# Patient Record
Sex: MALE | URBAN - NONMETROPOLITAN AREA
[De-identification: names, ages, dates, MRNs, and addresses within clinical notes are randomized per-mention and may not be internally consistent; named-entity substitution may affect disease eponyms.]

---

## 2024-02-20 ENCOUNTER — APPOINTMENT (OUTPATIENT)
Dept: URBAN - NONMETROPOLITAN AREA CLINIC 48 | Age: 69
Setting detail: DERMATOLOGY
End: 2024-02-22

## 2024-02-20 DIAGNOSIS — L28.0 LICHEN SIMPLEX CHRONICUS: ICD-10-CM

## 2024-02-20 PROCEDURE — 99214 OFFICE O/P EST MOD 30 MIN: CPT

## 2024-02-20 PROCEDURE — OTHER COUNSELING: OTHER

## 2024-02-20 PROCEDURE — OTHER MIPS QUALITY: OTHER

## 2024-02-20 PROCEDURE — OTHER PRESCRIPTION MEDICATION MANAGEMENT: OTHER

## 2024-02-20 PROCEDURE — OTHER ADDITIONAL NOTES: OTHER

## 2024-02-20 ASSESSMENT — LOCATION DETAILED DESCRIPTION DERM
LOCATION DETAILED: LEFT VENTRAL PROXIMAL FOREARM
LOCATION DETAILED: LEFT ANTERIOR PROXIMAL THIGH
LOCATION DETAILED: INFERIOR THORACIC SPINE
LOCATION DETAILED: PERIUMBILICAL SKIN
LOCATION DETAILED: RIGHT ANTERIOR PROXIMAL THIGH
LOCATION DETAILED: RIGHT ANTECUBITAL SKIN

## 2024-02-20 ASSESSMENT — LOCATION SIMPLE DESCRIPTION DERM
LOCATION SIMPLE: UPPER BACK
LOCATION SIMPLE: ABDOMEN
LOCATION SIMPLE: RIGHT THIGH
LOCATION SIMPLE: LEFT FOREARM
LOCATION SIMPLE: LEFT THIGH
LOCATION SIMPLE: RIGHT ELBOW

## 2024-02-20 ASSESSMENT — LOCATION ZONE DERM
LOCATION ZONE: LEG
LOCATION ZONE: TRUNK
LOCATION ZONE: ARM

## 2024-02-20 NOTE — PROCEDURE: ADDITIONAL NOTES
Additional Notes: Pt states he has no sanam in dermatologists given years of non resolution for this condition.  No biopsy was done until he had a recently unrelated GI surgery and that surgeon did an excisional bx.  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  Notes from surgeon indicated suspicion of pickers lesions.  Pt states he has read that this could be due to nerves but denies anxiety or stress.   No treatments for stress or anxiety have been tried to date.  PCP has just left and he will be seeing a new provider next week.  He has had gabapentin in the past (unsre if it was for this or something else) and he did not tolerate it well.  Discussed that will discuss condition and treatment with new PCP will be seeing
Detail Level: Simple
Render Risk Assessment In Note?: yes

## 2024-02-20 NOTE — PROCEDURE: PRESCRIPTION MEDICATION MANAGEMENT
Render In Strict Bullet Format?: No
Detail Level: Zone
Initiate Treatment: Discussed importance of avoiding picking, keep skin well moisturized

## 2024-06-04 ENCOUNTER — APPOINTMENT (OUTPATIENT)
Dept: URBAN - METROPOLITAN AREA CLINIC 216 | Age: 69
Setting detail: DERMATOLOGY
End: 2024-06-06

## 2024-06-04 DIAGNOSIS — L28.1 PRURIGO NODULARIS: ICD-10-CM

## 2024-06-04 PROCEDURE — 99214 OFFICE O/P EST MOD 30 MIN: CPT

## 2024-06-04 PROCEDURE — OTHER COUNSELING: OTHER

## 2024-06-04 PROCEDURE — OTHER DUPIXENT INJECTION: OTHER

## 2024-06-04 PROCEDURE — OTHER ORDER TESTS: OTHER

## 2024-06-04 ASSESSMENT — LOCATION SIMPLE DESCRIPTION DERM
LOCATION SIMPLE: RIGHT UPPER ARM
LOCATION SIMPLE: LEFT UPPER ARM

## 2024-06-04 ASSESSMENT — LOCATION DETAILED DESCRIPTION DERM
LOCATION DETAILED: RIGHT PROXIMAL POSTERIOR UPPER ARM
LOCATION DETAILED: LEFT PROXIMAL POSTERIOR UPPER ARM

## 2024-06-04 ASSESSMENT — LOCATION ZONE DERM: LOCATION ZONE: ARM

## 2024-06-04 NOTE — PROCEDURE: DUPIXENT INJECTION
Ndc (300 Mg Prefilled Syringe): 5711-1972-38
Hide Non-Enhanced Ndc Variable: No
Detail Level: None
Lot # (Optional): [id number]
Ndc (200 Mg Prefilled Pen): 2826-4942-75
10299 Billing Preferences: 1
Render If Medication Purchased By Clinic In Visit Note?: Yes
Ndc (300 Mg Prefilled Pen): 1821-0686-65
Date Of Next Injection: 2 Weeks
Expiration Date (Optional): 2025/10/31
Syringe Size Used (Required For Enhanced Ndc): 300 mg/2ml prefilled pen
J-Code: 
Dupixent Dosing: 600 mg
Ndc (200 Mg Prefilled Syringe): 2653-5312-36
Administered By (Optional): THEA Bauer
Consent: The risks of pain and injection site reactions were reviewed with the patient prior to the injection.

## 2024-06-04 NOTE — PROCEDURE: ORDER TESTS
Performing Laboratory: [id number]
Bill For Surgical Tray: no
Expected Date Of Service: 06/04/2024
Billing Type: Third-Party Bill
Lab Facility: [id number]

## 2024-06-18 ENCOUNTER — APPOINTMENT (OUTPATIENT)
Dept: URBAN - METROPOLITAN AREA CLINIC 216 | Age: 69
Setting detail: DERMATOLOGY
End: 2024-06-18

## 2024-06-18 DIAGNOSIS — L28.1 PRURIGO NODULARIS: ICD-10-CM

## 2024-06-18 PROCEDURE — OTHER PRESCRIPTION: OTHER

## 2024-06-18 PROCEDURE — OTHER COUNSELING: OTHER

## 2024-06-18 PROCEDURE — OTHER DUPIXENT INJECTION: OTHER

## 2024-06-18 PROCEDURE — 99213 OFFICE O/P EST LOW 20 MIN: CPT

## 2024-06-18 RX ORDER — DUPILUMAB 300 MG/2ML
300MG INJECTION, SOLUTION SUBCUTANEOUS EVERY 2 WEEKS
Qty: 12 | Refills: 3 | Status: ERX | COMMUNITY
Start: 2024-06-18

## 2024-06-18 ASSESSMENT — LOCATION ZONE DERM: LOCATION ZONE: TRUNK

## 2024-06-18 ASSESSMENT — LOCATION DETAILED DESCRIPTION DERM: LOCATION DETAILED: PERIUMBILICAL SKIN

## 2024-06-18 ASSESSMENT — LOCATION SIMPLE DESCRIPTION DERM: LOCATION SIMPLE: ABDOMEN

## 2024-06-18 NOTE — PROCEDURE: DUPIXENT INJECTION
Ndc (300 Mg Prefilled Syringe): 9016-2724-70
Hide Non-Enhanced Ndc Variable: No
Detail Level: None
Lot # (Optional): [id number]
Ndc (200 Mg Prefilled Pen): 4556-1692-76
18782 Billing Preferences: 1
Render If Medication Purchased By Clinic In Visit Note?: Yes
Ndc (300 Mg Prefilled Pen): 4577-4957-74
Date Of Next Injection: 2 Weeks
Additional Comments: Pt given sample in office today, dupixent my way form filled out today, gave patient one injection to take home to self administer in 2 weeks
Expiration Date (Optional): 2026/08
Syringe Size Used (Required For Enhanced Ndc): 300 mg/2ml prefilled syringe
J-Code: 
Dupixent Dosing: 300 mg
Ndc (200 Mg Prefilled Syringe): 3064-0718-54
Administered By (Optional): Kelsey granger
Consent: The risks of pain and injection site reactions were reviewed with the patient prior to the injection.

## 2024-07-10 RX ORDER — DUPILUMAB 300 MG/2ML
300MG INJECTION, SOLUTION SUBCUTANEOUS EVERY 2 WEEKS
Qty: 12 | Refills: 3

## 2024-07-16 ENCOUNTER — APPOINTMENT (OUTPATIENT)
Dept: URBAN - METROPOLITAN AREA CLINIC 216 | Age: 69
Setting detail: DERMATOLOGY
End: 2024-07-18

## 2024-07-16 DIAGNOSIS — L28.1 PRURIGO NODULARIS: ICD-10-CM

## 2024-07-16 PROCEDURE — OTHER PRESCRIPTION: OTHER

## 2024-07-16 PROCEDURE — OTHER DUPIXENT INJECTION: OTHER

## 2024-07-16 PROCEDURE — 99213 OFFICE O/P EST LOW 20 MIN: CPT

## 2024-07-16 PROCEDURE — OTHER COUNSELING: OTHER

## 2024-07-16 RX ORDER — CLOBETASOL PROPIONATE 0.5 MG/G
OINTMENT TOPICAL TWICE DAILY
Qty: 60 | Refills: 3 | Status: ERX | COMMUNITY
Start: 2024-07-16

## 2024-07-16 RX ORDER — DUPILUMAB 300 MG/2ML
300MG INJECTION, SOLUTION SUBCUTANEOUS EVERY 2 WEEKS
Qty: 12 | Refills: 3 | Status: ERX

## 2024-07-16 ASSESSMENT — ITCH INTENSITY: HOW SEVERE IS YOUR ITCHING?: 2

## 2024-07-16 ASSESSMENT — LOCATION ZONE DERM: LOCATION ZONE: LEG

## 2024-07-16 ASSESSMENT — LOCATION DETAILED DESCRIPTION DERM: LOCATION DETAILED: RIGHT ANTERIOR PROXIMAL THIGH

## 2024-07-16 ASSESSMENT — LOCATION SIMPLE DESCRIPTION DERM: LOCATION SIMPLE: RIGHT THIGH

## 2024-07-16 NOTE — PROCEDURE: DUPIXENT INJECTION
Ndc (300 Mg Prefilled Syringe): 4059-0628-80
Hide Non-Enhanced Ndc Variable: No
Detail Level: None
Lot # (Optional): [id number]
Ndc (200 Mg Prefilled Pen): 9591-3986-59
00171 Billing Preferences: 1
Render If Medication Purchased By Clinic In Visit Note?: Yes
Ndc (300 Mg Prefilled Pen): 8493-2426-31
Date Of Next Injection: 2 Weeks
Additional Comments: Pt given sample in office today, and advised pt to call to set up future shipments from pharmacy
Expiration Date (Optional): 2026/08
Syringe Size Used (Required For Enhanced Ndc): 300 mg/2ml prefilled syringe
J-Code: 
Dupixent Dosing: 300 mg
Ndc (200 Mg Prefilled Syringe): 9032-6745-35
Administered By (Optional): Kelsey granger
Consent: The risks of pain and injection site reactions were reviewed with the patient prior to the injection.

## 2024-07-25 ENCOUNTER — APPOINTMENT (OUTPATIENT)
Dept: URBAN - METROPOLITAN AREA CLINIC 216 | Age: 69
Setting detail: DERMATOLOGY
End: 2024-07-25

## 2024-08-20 ENCOUNTER — APPOINTMENT (OUTPATIENT)
Dept: URBAN - METROPOLITAN AREA CLINIC 216 | Age: 69
Setting detail: DERMATOLOGY
End: 2024-08-21

## 2024-08-20 DIAGNOSIS — L28.1 PRURIGO NODULARIS: ICD-10-CM

## 2024-08-20 PROCEDURE — OTHER DUPIXENT INJECTION: OTHER

## 2024-08-20 PROCEDURE — OTHER COUNSELING: OTHER

## 2024-08-20 PROCEDURE — 99214 OFFICE O/P EST MOD 30 MIN: CPT

## 2024-08-20 PROCEDURE — OTHER PRESCRIPTION: OTHER

## 2024-08-20 RX ORDER — TRIAMCINOLONE ACETONIDE 1 MG/G
THIN LAYER OINTMENT TOPICAL TWICE A DAY
Qty: 454 | Refills: 1 | Status: ERX | COMMUNITY
Start: 2024-08-20

## 2024-08-20 RX ORDER — CLOBETASOL PROPIONATE 0.5 MG/G
OINTMENT TOPICAL TWICE DAILY
Qty: 60 | Refills: 3 | Status: CANCELLED

## 2024-08-20 ASSESSMENT — LOCATION DETAILED DESCRIPTION DERM
LOCATION DETAILED: RIGHT ANTERIOR PROXIMAL THIGH
LOCATION DETAILED: LEFT ANTERIOR DISTAL THIGH
LOCATION DETAILED: LEFT LATERAL ABDOMEN

## 2024-08-20 ASSESSMENT — LOCATION SIMPLE DESCRIPTION DERM
LOCATION SIMPLE: ABDOMEN
LOCATION SIMPLE: RIGHT THIGH
LOCATION SIMPLE: LEFT THIGH

## 2024-08-20 ASSESSMENT — LOCATION ZONE DERM
LOCATION ZONE: TRUNK
LOCATION ZONE: LEG

## 2024-08-20 ASSESSMENT — ITCH INTENSITY: HOW SEVERE IS YOUR ITCHING?: 1

## 2024-08-20 NOTE — PROCEDURE: DUPIXENT INJECTION
Ndc (300 Mg Prefilled Syringe): 5171-9609-49
Hide Non-Enhanced Ndc Variable: No
Detail Level: None
Lot # (Optional): [id number]
Ndc (200 Mg Prefilled Pen): 5047-6948-91
Was The Medication Purchased By The Clinic?: No - Sample Provided
55158 Billing Preferences: 1
Render If Medication Purchased By Clinic In Visit Note?: Yes
Ndc (300 Mg Prefilled Pen): 8782-1293-30
Date Of Next Injection: 2 Weeks
Additional Comments: Pt given sample in office today, 2 syringes taken home with him
Expiration Date (Optional): 2026/03/31
Syringe Size Used (Required For Enhanced Ndc): 300 mg/2ml prefilled syringe
J-Code: 
Dupixent Dosing: 300 mg
Ndc (200 Mg Prefilled Syringe): 7403-1899-38
Administered By (Optional): Yajaira Mayfield, CMA
Consent: The risks of pain and injection site reactions were reviewed with the patient prior to the injection.